# Patient Record
Sex: FEMALE | Race: WHITE | NOT HISPANIC OR LATINO | URBAN - METROPOLITAN AREA
[De-identification: names, ages, dates, MRNs, and addresses within clinical notes are randomized per-mention and may not be internally consistent; named-entity substitution may affect disease eponyms.]

---

## 2018-04-04 ENCOUNTER — OUTPATIENT (OUTPATIENT)
Dept: OUTPATIENT SERVICES | Facility: HOSPITAL | Age: 78
LOS: 1 days | End: 2018-04-04

## 2018-04-04 ENCOUNTER — INPATIENT (INPATIENT)
Facility: HOSPITAL | Age: 78
LOS: 3 days | Discharge: HOME | End: 2018-04-08
Payer: MEDICARE

## 2018-04-04 PROCEDURE — 99284 EMERGENCY DEPT VISIT MOD MDM: CPT

## 2018-04-04 PROCEDURE — 71045 X-RAY EXAM CHEST 1 VIEW: CPT | Mod: 26

## 2018-04-04 PROCEDURE — 71250 CT THORAX DX C-: CPT | Mod: 26

## 2018-04-05 ENCOUNTER — OUTPATIENT (OUTPATIENT)
Dept: OUTPATIENT SERVICES | Facility: HOSPITAL | Age: 78
LOS: 1 days | End: 2018-04-05

## 2018-04-06 ENCOUNTER — OUTPATIENT (OUTPATIENT)
Dept: OUTPATIENT SERVICES | Facility: HOSPITAL | Age: 78
LOS: 1 days | End: 2018-04-06

## 2018-04-06 PROCEDURE — 71046 X-RAY EXAM CHEST 2 VIEWS: CPT | Mod: 26

## 2018-04-07 ENCOUNTER — OUTPATIENT (OUTPATIENT)
Dept: OUTPATIENT SERVICES | Facility: HOSPITAL | Age: 78
LOS: 1 days | End: 2018-04-07

## 2018-04-08 ENCOUNTER — OUTPATIENT (OUTPATIENT)
Dept: OUTPATIENT SERVICES | Facility: HOSPITAL | Age: 78
LOS: 1 days | End: 2018-04-08

## 2019-01-28 ENCOUNTER — EMERGENCY (EMERGENCY)
Facility: HOSPITAL | Age: 79
LOS: 1 days | End: 2019-01-28
Payer: MEDICARE

## 2019-01-28 PROCEDURE — 71045 X-RAY EXAM CHEST 1 VIEW: CPT | Mod: 26

## 2019-01-28 PROCEDURE — 99285 EMERGENCY DEPT VISIT HI MDM: CPT

## 2019-11-21 ENCOUNTER — EMERGENCY (EMERGENCY)
Facility: HOSPITAL | Age: 79
LOS: 1 days | End: 2019-11-21
Admitting: EMERGENCY MEDICINE
Payer: MEDICARE

## 2019-11-21 PROCEDURE — 71045 X-RAY EXAM CHEST 1 VIEW: CPT | Mod: 26

## 2019-11-21 PROCEDURE — 99285 EMERGENCY DEPT VISIT HI MDM: CPT

## 2019-11-22 PROCEDURE — 93010 ELECTROCARDIOGRAM REPORT: CPT

## 2019-12-06 PROBLEM — Z00.00 ENCOUNTER FOR PREVENTIVE HEALTH EXAMINATION: Status: ACTIVE | Noted: 2019-12-06

## 2019-12-09 ENCOUNTER — NON-APPOINTMENT (OUTPATIENT)
Age: 79
End: 2019-12-09

## 2019-12-09 ENCOUNTER — APPOINTMENT (OUTPATIENT)
Dept: CARDIOLOGY | Facility: CLINIC | Age: 79
End: 2019-12-09
Payer: MEDICARE

## 2019-12-09 VITALS
HEIGHT: 61 IN | WEIGHT: 265 LBS | SYSTOLIC BLOOD PRESSURE: 178 MMHG | HEART RATE: 84 BPM | DIASTOLIC BLOOD PRESSURE: 90 MMHG | BODY MASS INDEX: 50.03 KG/M2 | OXYGEN SATURATION: 96 %

## 2019-12-09 DIAGNOSIS — Z86.79 PERSONAL HISTORY OF OTHER DISEASES OF THE CIRCULATORY SYSTEM: ICD-10-CM

## 2019-12-09 DIAGNOSIS — Z86.69 PERSONAL HISTORY OF OTHER DISEASES OF THE NERVOUS SYSTEM AND SENSE ORGANS: ICD-10-CM

## 2019-12-09 DIAGNOSIS — Z80.3 FAMILY HISTORY OF MALIGNANT NEOPLASM OF BREAST: ICD-10-CM

## 2019-12-09 DIAGNOSIS — Z82.49 FAMILY HISTORY OF ISCHEMIC HEART DISEASE AND OTHER DISEASES OF THE CIRCULATORY SYSTEM: ICD-10-CM

## 2019-12-09 DIAGNOSIS — Z80.42 FAMILY HISTORY OF MALIGNANT NEOPLASM OF PROSTATE: ICD-10-CM

## 2019-12-09 DIAGNOSIS — Z78.9 OTHER SPECIFIED HEALTH STATUS: ICD-10-CM

## 2019-12-09 PROCEDURE — 99214 OFFICE O/P EST MOD 30 MIN: CPT

## 2019-12-11 PROCEDURE — 93224 XTRNL ECG REC UP TO 48 HRS: CPT

## 2019-12-16 NOTE — PHYSICAL EXAM
[General Appearance - Well Developed] : well developed [Normal Appearance] : normal appearance [Well Groomed] : well groomed [General Appearance - Well Nourished] : well nourished [No Deformities] : no deformities [General Appearance - In No Acute Distress] : no acute distress [Normal Conjunctiva] : the conjunctiva exhibited no abnormalities [Eyelids - No Xanthelasma] : the eyelids demonstrated no xanthelasmas [Normal Oral Mucosa] : normal oral mucosa [No Oral Pallor] : no oral pallor [No Oral Cyanosis] : no oral cyanosis [Normal Jugular Venous A Waves Present] : normal jugular venous A waves present [Normal Jugular Venous V Waves Present] : normal jugular venous V waves present [No Jugular Venous Morse A Waves] : no jugular venous morse A waves [Heart Rate And Rhythm] : heart rate and rhythm were normal [Heart Sounds] : normal S1 and S2 [Murmurs] : no murmurs present [Respiration, Rhythm And Depth] : normal respiratory rhythm and effort [Exaggerated Use Of Accessory Muscles For Inspiration] : no accessory muscle use [Auscultation Breath Sounds / Voice Sounds] : lungs were clear to auscultation bilaterally [Abdomen Soft] : soft [Abdomen Tenderness] : non-tender [Nail Clubbing] : no clubbing of the fingernails [Cyanosis, Localized] : no localized cyanosis [Petechial Hemorrhages (___cm)] : no petechial hemorrhages [] : no ischemic changes [FreeTextEntry1] : Venous stasis b/l lower legs.

## 2019-12-16 NOTE — ASSESSMENT
[FreeTextEntry1] : To review, Natalya is a pleasant 79-year-old female seen in cardiac consultation with the following active issues.\par Palpitations: No dizziness, syncope or near syncope. Holter monitor for further evaluation. \par Edema: Echocardiogram to evaluate for any significant cardiomyopathy valvulopathy. Continue furosemide. She is scheduled for repeat lab work through her PMD.\par Obesity: Increase activity as tolerated. Decrease caloric intake. Recommend following Mediterranean diet.\par Abnormal EKG: No prior cardiac evaluation. No history of syncope. With her inability to move enough to elicit any exertional symptoms in conjunction with palpitations, recommend evaluation with pharmacologic nuclear stress test.\par Hypertension: Not well-controlled at today's visit. Home blood pressure monitor reveals normotension.  Patient has multiple blood pressure medication intolerances. Tolerating furosemide. Continue current medications for now. Further recommendations based on the above testing

## 2019-12-16 NOTE — REASON FOR VISIT
[Follow-Up - From Hospitalization] : follow-up of a recent hospitalization for [FreeTextEntry2] : Palpitations [FreeTextEntry1] : Natalya is a very pleasant 79-year-old female seen today with her daughter for cardiac consultation. Recently seen in [facility] ED for palpitations. She has a past medical history as noted below. Notable for hypertension with multiple medication intolerances. \par \par Palpitations have been ongoing for quite some time but has worsened since November.  She presented to the ED for further evaluation. There are no records to review at this time. She was given a single dose of diltiazem in the ED. Unsure as to why. She was discharged for outpatient followup.\par neli Presents today for consultation. States palpitations have continued. They occur on a daily basis. Worse in the morning or at night when she is laying still. She checks her pulse and said she skips beats. There is no associated chest pain, shortness of breath, dizziness, syncope or near syncope.\par \par History of hypertension, Multiple medicine intolerances to include valsartan, lisinopril, Norvasc, Bystolic, and atenolol.  Patient became edematous with Norvasc. Lost 6 pounds with discontinuation and addition of furosemide which is the only medication she is currently taking. Tolerating well. Scheduled for blood work later this week.\par \par BP on my exam was 156/74 mmHg.  Blood pressure readings from home are all in the 120s/60s.\par Patient is not active. Leads a very sedentary lifestyle.  There are stairs in her house but she is unable to utilize them.

## 2019-12-26 ENCOUNTER — APPOINTMENT (OUTPATIENT)
Dept: CARDIOLOGY | Facility: CLINIC | Age: 79
End: 2019-12-26
Payer: MEDICARE

## 2019-12-26 PROCEDURE — 93306 TTE W/DOPPLER COMPLETE: CPT

## 2019-12-30 ENCOUNTER — APPOINTMENT (OUTPATIENT)
Dept: CARDIOLOGY | Facility: CLINIC | Age: 79
End: 2019-12-30
Payer: MEDICARE

## 2019-12-30 VITALS
WEIGHT: 268 LBS | SYSTOLIC BLOOD PRESSURE: 170 MMHG | BODY MASS INDEX: 50.6 KG/M2 | DIASTOLIC BLOOD PRESSURE: 98 MMHG | HEIGHT: 61 IN | HEART RATE: 79 BPM | OXYGEN SATURATION: 98 %

## 2019-12-30 PROCEDURE — 99214 OFFICE O/P EST MOD 30 MIN: CPT

## 2019-12-30 NOTE — PHYSICAL EXAM
[General Appearance - Well Developed] : well developed [Normal Appearance] : normal appearance [Well Groomed] : well groomed [General Appearance - Well Nourished] : well nourished [No Deformities] : no deformities [General Appearance - In No Acute Distress] : no acute distress [Normal Conjunctiva] : the conjunctiva exhibited no abnormalities [Eyelids - No Xanthelasma] : the eyelids demonstrated no xanthelasmas [Normal Oral Mucosa] : normal oral mucosa [No Oral Pallor] : no oral pallor [No Oral Cyanosis] : no oral cyanosis [Normal Jugular Venous A Waves Present] : normal jugular venous A waves present [Normal Jugular Venous V Waves Present] : normal jugular venous V waves present [No Jugular Venous Morse A Waves] : no jugular venous morse A waves [Respiration, Rhythm And Depth] : normal respiratory rhythm and effort [Exaggerated Use Of Accessory Muscles For Inspiration] : no accessory muscle use [Heart Rate And Rhythm] : heart rate and rhythm were normal [Auscultation Breath Sounds / Voice Sounds] : lungs were clear to auscultation bilaterally [Heart Sounds] : normal S1 and S2 [Murmurs] : no murmurs present [Abdomen Soft] : soft [Abdomen Tenderness] : non-tender [Nail Clubbing] : no clubbing of the fingernails [Cyanosis, Localized] : no localized cyanosis [Petechial Hemorrhages (___cm)] : no petechial hemorrhages [] : no ischemic changes [FreeTextEntry1] : Venous stasis b/l lower legs.

## 2019-12-30 NOTE — HISTORY OF PRESENT ILLNESS
[FreeTextEntry1] : 12/9/19. Holter monitor. Sinus rhythm. Minimum HR: 65, maximum 8:120, average HR: 78 rate occasional PACs with risk or chills of 2-5 beats at a 162 beats per minute. Occasional to frequent unifocal PVCs. Symptomatic PVCs.\par 12/26/19. Technically difficult echocardiogram. EF: 55%. Mild MR, TR and AR.  Minimal PI. No pericardial effusion. Moderate PHTN.

## 2019-12-30 NOTE — REASON FOR VISIT
[Follow-Up - From Hospitalization] : follow-up of a recent hospitalization for [FreeTextEntry1] : Natalya is a pleasant 79-year-old female seen today with her daughter to follow up on CV testing. Was seen in [facility] ED for palpitations. She has a past medical history as noted below. Notable for hypertension with multiple medication intolerances. \par \par Palpitations have been ongoing for quite some time but has worsened since November.  She presented to the ED for further evaluation.  Was discharged for outpatient follow up.  No further hospitalizations. \par \par States palpitations have continued. They occur on a daily basis. Worse in the morning or at night when she is laying still. She checks her pulse and said she skips beats. There is no associated chest pain, shortness of breath, dizziness, syncope or near syncope.\par \par History of hypertension, Multiple medicine intolerances to include valsartan, lisinopril, Norvasc, Bystolic, and atenolol.  Patient became edematous with Norvasc. Lost 6 pounds with discontinuation and addition of furosemide which is the only medication she is currently taking. Tolerating well. \par \par BP on my exam was 160/78 mmHg.  Pt insists that blood pressure readings from home are all in the 120s/60s.\par Patient is not active. Leads a very sedentary lifestyle.  There are stairs in her house but she is unable to utilize them. [FreeTextEntry2] : Palpitations

## 2019-12-30 NOTE — ASSESSMENT
[FreeTextEntry1] : To review, Natalya is a pleasant 79-year-old female seen in cardiac consultation with the following active issues.\par Palpitations: No dizziness, syncope or near syncope. No significant arrhythmia noted on Holter monitor. Frequent PVCs some of which were symptomatic. Patient has multiple medication intolerances to include Bystolic and atenolol.  Normal EF. Patient refused nuclear stress test. Recommend low dose metoprolol. 12.5 mg q.d. with plan to up titrate to 25 mg if patient tolerates.\par \par Edema: Improved with furosemide.  Continue.  Low Na+ diet.  Leg elevation. \par \par Obesity: Increase activity as tolerated. Decrease caloric intake. Recommend following Mediterranean diet.\par \par Abnormal EKG: No prior cardiac evaluation. No history of syncope. With her inability to move enough to elicit any exertional symptoms in conjunction with palpitations, recommend evaluation with pharmacologic nuclear stress test.  Pt refused.  Red flag symptoms that would warrant emergent evaluation discussed.  Pt verbalizes understanding. \par \par Hypertension: ? white coat syndrome.  Not well-controlled at today's visit. Home blood pressure monitor reveals normotension.  Patient has multiple blood pressure medication intolerances. Tolerating furosemide. Add metoprolol as noted above. \par \par Close clinical follow up.  She is seeing endocrine next week and will be following up with PMD.  \par 3 month OV in our office.

## 2020-01-08 RX ORDER — METOPROLOL SUCCINATE 25 MG/1
25 TABLET, EXTENDED RELEASE ORAL
Qty: 90 | Refills: 2 | Status: DISCONTINUED | COMMUNITY
Start: 2019-12-30 | End: 2020-01-08

## 2020-02-20 ENCOUNTER — OUTPATIENT (OUTPATIENT)
Dept: OUTPATIENT SERVICES | Facility: HOSPITAL | Age: 80
LOS: 1 days | End: 2020-02-20

## 2020-04-06 ENCOUNTER — APPOINTMENT (OUTPATIENT)
Dept: CARDIOLOGY | Facility: CLINIC | Age: 80
End: 2020-04-06

## 2020-06-22 ENCOUNTER — OUTPATIENT (OUTPATIENT)
Dept: OUTPATIENT SERVICES | Facility: HOSPITAL | Age: 80
LOS: 1 days | End: 2020-06-22

## 2020-07-03 ENCOUNTER — OUTPATIENT (OUTPATIENT)
Dept: OUTPATIENT SERVICES | Facility: HOSPITAL | Age: 80
LOS: 1 days | End: 2020-07-03

## 2021-03-16 ENCOUNTER — OUTPATIENT (OUTPATIENT)
Dept: OUTPATIENT SERVICES | Facility: HOSPITAL | Age: 81
LOS: 1 days | End: 2021-03-16

## 2021-08-02 ENCOUNTER — OUTPATIENT (OUTPATIENT)
Dept: OUTPATIENT SERVICES | Facility: HOSPITAL | Age: 81
LOS: 1 days | End: 2021-08-02

## 2021-10-16 ENCOUNTER — EMERGENCY (EMERGENCY)
Facility: HOSPITAL | Age: 81
LOS: 1 days | End: 2021-10-16
Admitting: EMERGENCY MEDICINE
Payer: MEDICARE

## 2021-10-16 PROCEDURE — 99285 EMERGENCY DEPT VISIT HI MDM: CPT

## 2021-10-17 PROCEDURE — 71045 X-RAY EXAM CHEST 1 VIEW: CPT | Mod: 26

## 2021-10-17 PROCEDURE — 93010 ELECTROCARDIOGRAM REPORT: CPT

## 2022-01-08 ENCOUNTER — EMERGENCY (EMERGENCY)
Facility: HOSPITAL | Age: 82
LOS: 1 days | End: 2022-01-08
Payer: MEDICARE

## 2022-01-08 ENCOUNTER — OUTPATIENT (OUTPATIENT)
Dept: OUTPATIENT SERVICES | Facility: HOSPITAL | Age: 82
LOS: 1 days | End: 2022-01-08

## 2022-01-08 DIAGNOSIS — U07.1 COVID-19: ICD-10-CM

## 2022-01-08 DIAGNOSIS — Z88.2 ALLERGY STATUS TO SULFONAMIDES: ICD-10-CM

## 2022-01-08 DIAGNOSIS — R53.1 WEAKNESS: ICD-10-CM

## 2022-01-08 DIAGNOSIS — I45.81 LONG QT SYNDROME: ICD-10-CM

## 2022-01-08 DIAGNOSIS — I49.8 OTHER SPECIFIED CARDIAC ARRHYTHMIAS: ICD-10-CM

## 2022-01-08 DIAGNOSIS — E04.1 NONTOXIC SINGLE THYROID NODULE: ICD-10-CM

## 2022-01-08 DIAGNOSIS — I10 ESSENTIAL (PRIMARY) HYPERTENSION: ICD-10-CM

## 2022-01-08 PROCEDURE — 71275 CT ANGIOGRAPHY CHEST: CPT | Mod: 26

## 2022-01-08 PROCEDURE — 99285 EMERGENCY DEPT VISIT HI MDM: CPT | Mod: CS

## 2022-01-08 PROCEDURE — 93010 ELECTROCARDIOGRAM REPORT: CPT

## 2022-01-08 PROCEDURE — 71045 X-RAY EXAM CHEST 1 VIEW: CPT | Mod: 26

## 2022-01-09 ENCOUNTER — OUTPATIENT (OUTPATIENT)
Dept: OUTPATIENT SERVICES | Facility: HOSPITAL | Age: 82
LOS: 1 days | End: 2022-01-09

## 2022-01-10 ENCOUNTER — OUTPATIENT (OUTPATIENT)
Dept: OUTPATIENT SERVICES | Facility: HOSPITAL | Age: 82
LOS: 1 days | End: 2022-01-10

## 2022-01-10 PROCEDURE — 93010 ELECTROCARDIOGRAM REPORT: CPT

## 2022-01-20 ENCOUNTER — OUTPATIENT (OUTPATIENT)
Dept: OUTPATIENT SERVICES | Facility: HOSPITAL | Age: 82
LOS: 1 days | End: 2022-01-20

## 2022-01-24 ENCOUNTER — NON-APPOINTMENT (OUTPATIENT)
Age: 82
End: 2022-01-24

## 2022-01-24 ENCOUNTER — APPOINTMENT (OUTPATIENT)
Dept: CARDIOLOGY | Facility: CLINIC | Age: 82
End: 2022-01-24
Payer: MEDICARE

## 2022-01-24 VITALS
TEMPERATURE: 97.5 F | HEIGHT: 61 IN | BODY MASS INDEX: 50.98 KG/M2 | WEIGHT: 270 LBS | HEART RATE: 83 BPM | OXYGEN SATURATION: 97 % | SYSTOLIC BLOOD PRESSURE: 180 MMHG | DIASTOLIC BLOOD PRESSURE: 88 MMHG

## 2022-01-24 PROCEDURE — 99214 OFFICE O/P EST MOD 30 MIN: CPT

## 2022-01-24 NOTE — ASSESSMENT
[FreeTextEntry1] : To review, Natalya is a pleasant 81-year-old female seen in cardiac consultation with the following active issues.\par Palpitations: No dizziness, syncope or near syncope. No significant arrhythmia noted on Holter monitor. Frequent PVCs some of which were symptomatic. Patient has multiple medication intolerances to include Bystolic and atenolol.  Normal EF. Patient refused nuclear stress test. Recommend low dose metoprolol. 12.5 mg q.d. with plan to up titrate to 25 mg if patient tolerates.\par \par Edema: Improved with furosemide.  Continue.  Low Na+ diet.  Leg elevation. \par \par Obesity: Increase activity as tolerated. Decrease caloric intake. Recommend following Mediterranean diet.\par \par Abnormal EKG: No prior cardiac evaluation. No history of syncope. With her inability to move enough to elicit any exertional symptoms in conjunction with palpitations, recommend evaluation with pharmacologic nuclear stress test.  Pt refused.  Red flag symptoms that would warrant emergent evaluation discussed.  Pt verbalizes understanding. \par \par Hypertension: ? white coat syndrome.  Not well-controlled at today's visit. Home blood pressure monitor reveals normotension.  Patient has multiple blood pressure medication intolerances. Tolerating furosemide. Add metoprolol as noted above. \par \par Close clinical follow up.  She is seeing endocrine next week and will be following up with PMD.  \par 3 month OV in our office.

## 2022-01-24 NOTE — PHYSICAL EXAM
[Normal Jugular Venous A Waves Present] : normal jugular venous A waves present [Normal Jugular Venous V Waves Present] : normal jugular venous V waves present [No Jugular Venous Morse A Waves] : no jugular venous morse A waves [General Appearance - Well Developed] : well developed [Normal Appearance] : normal appearance [Well Groomed] : well groomed [General Appearance - Well Nourished] : well nourished [No Deformities] : no deformities [General Appearance - In No Acute Distress] : no acute distress [Normal Conjunctiva] : the conjunctiva exhibited no abnormalities [Eyelids - No Xanthelasma] : the eyelids demonstrated no xanthelasmas [Normal Oral Mucosa] : normal oral mucosa [No Oral Pallor] : no oral pallor [No Oral Cyanosis] : no oral cyanosis [Respiration, Rhythm And Depth] : normal respiratory rhythm and effort [Exaggerated Use Of Accessory Muscles For Inspiration] : no accessory muscle use [Auscultation Breath Sounds / Voice Sounds] : lungs were clear to auscultation bilaterally [Heart Rate And Rhythm] : heart rate and rhythm were normal [Heart Sounds] : normal S1 and S2 [Murmurs] : no murmurs present [Abdomen Soft] : soft [Abdomen Tenderness] : non-tender [Nail Clubbing] : no clubbing of the fingernails [Cyanosis, Localized] : no localized cyanosis [Petechial Hemorrhages (___cm)] : no petechial hemorrhages [] : no ischemic changes [FreeTextEntry1] : Venous stasis b/l lower legs.

## 2022-01-24 NOTE — REASON FOR VISIT
[Follow-Up - From Hospitalization] : follow-up of a recent hospitalization for [FreeTextEntry1] : Natalya is a pleasant 81-year-old female who presents today for cardiac follow up. She has a past medical history as noted below. Notable for hypertension with multiple medication intolerances. \par \par Palpitations have been ongoing for quite some time but has worsened since November.  She presented to the ED for further evaluation.  Was discharged for outpatient follow up.  No further hospitalizations. \par \par States palpitations have continued. They occur on a daily basis. Worse in the morning or at night when she is laying still. She checks her pulse and said she skips beats. There is no associated chest pain, shortness of breath, dizziness, syncope or near syncope.\par \par History of hypertension, Multiple medicine intolerances to include valsartan, lisinopril, Norvasc, Bystolic, and atenolol.  Patient became edematous with Norvasc. Lost 6 pounds with discontinuation and addition of furosemide which is the only medication she is currently taking. Tolerating well. \par \par BP on my exam was 160/78 mmHg.  Pt insists that blood pressure readings from home are all in the 120s/60s.\par Patient is not active. Leads a very sedentary lifestyle.  There are stairs in her house but she is unable to utilize them. [FreeTextEntry2] : Palpitations

## 2022-01-27 DIAGNOSIS — D69.6 THROMBOCYTOPENIA, UNSPECIFIED: ICD-10-CM

## 2022-02-07 DIAGNOSIS — R94.31 ABNORMAL ELECTROCARDIOGRAM [ECG] [EKG]: ICD-10-CM

## 2022-02-07 DIAGNOSIS — U07.1 COVID-19: ICD-10-CM

## 2022-02-07 DIAGNOSIS — I10 ESSENTIAL (PRIMARY) HYPERTENSION: ICD-10-CM

## 2022-02-21 ENCOUNTER — APPOINTMENT (OUTPATIENT)
Dept: CARDIOLOGY | Facility: CLINIC | Age: 82
End: 2022-02-21
Payer: MEDICARE

## 2022-02-21 VITALS
DIASTOLIC BLOOD PRESSURE: 84 MMHG | HEART RATE: 77 BPM | HEIGHT: 61 IN | TEMPERATURE: 96.9 F | BODY MASS INDEX: 49.28 KG/M2 | WEIGHT: 261 LBS | SYSTOLIC BLOOD PRESSURE: 168 MMHG | OXYGEN SATURATION: 100 %

## 2022-02-21 PROCEDURE — 93306 TTE W/DOPPLER COMPLETE: CPT

## 2022-02-21 PROCEDURE — 99214 OFFICE O/P EST MOD 30 MIN: CPT

## 2022-02-21 RX ORDER — RIVAROXABAN 10 MG/1
10 TABLET, FILM COATED ORAL
Refills: 0 | Status: DISCONTINUED | COMMUNITY
End: 2022-02-21

## 2022-02-21 RX ORDER — FUROSEMIDE 20 MG/1
20 TABLET ORAL
Qty: 90 | Refills: 1 | Status: DISCONTINUED | COMMUNITY
Start: 1900-01-01 | End: 2022-02-21

## 2022-02-21 NOTE — ASSESSMENT
[FreeTextEntry1] : History of palpitations.  Frequent PVCs some of which were symptomatic; she says that her palpitations have decreased lately. Patient has multiple medication intolerances to include Bystolic and atenolol.  Normal EF. Patient refused nuclear stress test. Recommended low dose metoprolol. 12.5 mg q.d. which she stopped.  We can try bisoprolol but she declined.\par \par Long QTC on EKG at the [facility] when the patient was admitted with Covid.  EKG today: QTC is normal.  EKG is unchanged.\par \par Edema: Improved with furosemide. Low Na+ diet.  Leg elevation and compression stockings.  She has discontinued Lasix.  She feels the edema is okay.\par \par Obesity: Increase activity as tolerated. Decrease caloric intake. \par \par Patient with coronary risk factors.  Declined Lexiscan stress test in the past.  Echocardiogram in December 2019 showed normal EF and pulmonary hypertension PASP 52.  Echocardiogram in February 2022 showed normal EF, indeterminate diastolic function, septal thickness of 1.3 cm and PASP 52\par \par white coat syndrome and anxiety.  Blood pressure not well-controlled at today's visit. Home blood pressure monitor reveals normotension.  Patient has multiple blood pressure medication intolerances. Recommended low-dose bisoprolol which she declined\par \par Patient and daughter understand the risk of uncontrolled hypertension.\par \par Follow-up in 6 months\par \par Thank you for this referral and allowing me to participate in the care of this patient.  If I can be of any further help or  if you have any questions, please do not hesitate to contact me\par \par \par Sincerely,\par \par Rome Edwards MD, FAC, JOCY

## 2022-02-21 NOTE — PHYSICAL EXAM
[General Appearance - Well Developed] : well developed [Normal Appearance] : normal appearance [Well Groomed] : well groomed [No Deformities] : no deformities [General Appearance - Well Nourished] : well nourished [General Appearance - In No Acute Distress] : no acute distress [Normal Conjunctiva] : the conjunctiva exhibited no abnormalities [Eyelids - No Xanthelasma] : the eyelids demonstrated no xanthelasmas [Normal Oral Mucosa] : normal oral mucosa [No Oral Pallor] : no oral pallor [No Oral Cyanosis] : no oral cyanosis [Respiration, Rhythm And Depth] : normal respiratory rhythm and effort [Exaggerated Use Of Accessory Muscles For Inspiration] : no accessory muscle use [Auscultation Breath Sounds / Voice Sounds] : lungs were clear to auscultation bilaterally [Heart Rate And Rhythm] : heart rate and rhythm were normal [Heart Sounds] : normal S1 and S2 [Murmurs] : no murmurs present [Abdomen Soft] : soft [Abdomen Tenderness] : non-tender [Nail Clubbing] : no clubbing of the fingernails [Cyanosis, Localized] : no localized cyanosis [Petechial Hemorrhages (___cm)] : no petechial hemorrhages [] : no ischemic changes [FreeTextEntry1] : Venous stasis b/l lower legs.

## 2022-02-21 NOTE — REASON FOR VISIT
[FreeTextEntry1] : Natalya is a pleasant 81-year-old female-  was [place] [facility] [place] with Covid, dyspnea, fatigue and probably dehydration.  CT chest was negative for PE.  She was discharged with Xarelto 10 mg daily for 30 days for DVT prophylaxis.\par \par During hospitalization, EKG showed long QTC.  There was no polymorphic VT or torsade on telemetry.\par \par She has a past medical history-  Notable for hypertension with multiple medication intolerances.  Patient is known to have anxiety episodes and labile hypertension.  Pressure today is quite high however home blood pressure log shows normal readings.  She is asymptomatic for hypertension.  She takes lisinopril.  Lasix was stopped.\par \par Palpitations have been ongoing for quite some time  \par \par States palpitations have continued. There is no associated chest pain, shortness of breath, dizziness, syncope or near syncope.\par \par History of hypertension, Multiple medicine intolerances to include valsartan, lisinopril, Norvasc, Bystolic, and atenolol.  Patient became edematous with Norvasc. currently takes only lisinopril\par \par Patient is not active. Leads a very sedentary lifestyle.  There are stairs in her house but she is unable to utilize them.  Currently in wheelchair [Follow-Up - From Hospitalization] : follow-up of a recent hospitalization for [FreeTextEntry2] : Palpitations

## 2022-02-21 NOTE — REVIEW OF SYSTEMS
[Dyspnea on exertion] : dyspnea during exertion [Negative] : Respiratory [FreeTextEntry2] : With a cane

## 2022-02-22 ENCOUNTER — APPOINTMENT (OUTPATIENT)
Dept: ULTRASOUND IMAGING | Facility: CLINIC | Age: 82
End: 2022-02-22

## 2022-02-22 ENCOUNTER — APPOINTMENT (OUTPATIENT)
Dept: RADIOLOGY | Facility: CLINIC | Age: 82
End: 2022-02-22
Payer: MEDICARE

## 2022-02-22 PROCEDURE — 76536 US EXAM OF HEAD AND NECK: CPT

## 2022-02-22 PROCEDURE — 71046 X-RAY EXAM CHEST 2 VIEWS: CPT

## 2022-03-08 DIAGNOSIS — U07.1 COVID-19: ICD-10-CM

## 2022-03-08 DIAGNOSIS — R94.31 ABNORMAL ELECTROCARDIOGRAM [ECG] [EKG]: ICD-10-CM

## 2022-03-08 DIAGNOSIS — I10 ESSENTIAL (PRIMARY) HYPERTENSION: ICD-10-CM

## 2022-03-10 ENCOUNTER — EMERGENCY (EMERGENCY)
Facility: HOSPITAL | Age: 82
LOS: 1 days | Discharge: HOME | End: 2022-03-10
Admitting: STUDENT IN AN ORGANIZED HEALTH CARE EDUCATION/TRAINING PROGRAM
Payer: MEDICARE

## 2022-03-10 DIAGNOSIS — E66.9 OBESITY, UNSPECIFIED: ICD-10-CM

## 2022-03-10 DIAGNOSIS — I47.1 SUPRAVENTRICULAR TACHYCARDIA: ICD-10-CM

## 2022-03-10 DIAGNOSIS — I10 ESSENTIAL (PRIMARY) HYPERTENSION: ICD-10-CM

## 2022-03-10 DIAGNOSIS — Z87.891 PERSONAL HISTORY OF NICOTINE DEPENDENCE: ICD-10-CM

## 2022-03-10 DIAGNOSIS — R00.2 PALPITATIONS: ICD-10-CM

## 2022-03-10 PROCEDURE — 93010 ELECTROCARDIOGRAM REPORT: CPT

## 2022-03-10 PROCEDURE — 71045 X-RAY EXAM CHEST 1 VIEW: CPT | Mod: 26

## 2022-03-10 PROCEDURE — 99285 EMERGENCY DEPT VISIT HI MDM: CPT

## 2022-03-11 ENCOUNTER — OUTPATIENT (OUTPATIENT)
Dept: OUTPATIENT SERVICES | Facility: HOSPITAL | Age: 82
LOS: 1 days | End: 2022-03-11

## 2022-03-11 PROCEDURE — 99214 OFFICE O/P EST MOD 30 MIN: CPT

## 2022-03-11 PROCEDURE — 93010 ELECTROCARDIOGRAM REPORT: CPT

## 2022-03-31 DIAGNOSIS — R94.31 ABNORMAL ELECTROCARDIOGRAM [ECG] [EKG]: ICD-10-CM

## 2022-03-31 DIAGNOSIS — I10 ESSENTIAL (PRIMARY) HYPERTENSION: ICD-10-CM

## 2022-03-31 DIAGNOSIS — U07.1 COVID-19: ICD-10-CM

## 2022-04-02 DIAGNOSIS — I10 ESSENTIAL (PRIMARY) HYPERTENSION: ICD-10-CM

## 2022-04-02 DIAGNOSIS — R94.31 ABNORMAL ELECTROCARDIOGRAM [ECG] [EKG]: ICD-10-CM

## 2022-04-02 DIAGNOSIS — U07.1 COVID-19: ICD-10-CM

## 2022-08-19 ENCOUNTER — APPOINTMENT (OUTPATIENT)
Dept: CARDIOLOGY | Facility: CLINIC | Age: 82
End: 2022-08-19

## 2022-08-19 VITALS
HEART RATE: 84 BPM | RESPIRATION RATE: 14 BRPM | WEIGHT: 266 LBS | SYSTOLIC BLOOD PRESSURE: 186 MMHG | OXYGEN SATURATION: 97 % | BODY MASS INDEX: 50.22 KG/M2 | DIASTOLIC BLOOD PRESSURE: 94 MMHG | TEMPERATURE: 97.1 F | HEIGHT: 61 IN

## 2022-08-19 PROCEDURE — 99214 OFFICE O/P EST MOD 30 MIN: CPT

## 2022-08-19 NOTE — PHYSICAL EXAM
[General Appearance - Well Developed] : well developed [Normal Appearance] : normal appearance [Well Groomed] : well groomed [General Appearance - Well Nourished] : well nourished [No Deformities] : no deformities [General Appearance - In No Acute Distress] : no acute distress [Normal Conjunctiva] : the conjunctiva exhibited no abnormalities [Eyelids - No Xanthelasma] : the eyelids demonstrated no xanthelasmas [Normal Oral Mucosa] : normal oral mucosa [No Oral Pallor] : no oral pallor [No Oral Cyanosis] : no oral cyanosis [Respiration, Rhythm And Depth] : normal respiratory rhythm and effort [Exaggerated Use Of Accessory Muscles For Inspiration] : no accessory muscle use [Auscultation Breath Sounds / Voice Sounds] : lungs were clear to auscultation bilaterally [Heart Rate And Rhythm] : heart rate and rhythm were normal [Heart Sounds] : normal S1 and S2 [Murmurs] : no murmurs present [Abdomen Soft] : soft [Abdomen Tenderness] : non-tender [Nail Clubbing] : no clubbing of the fingernails [Cyanosis, Localized] : no localized cyanosis [Petechial Hemorrhages (___cm)] : no petechial hemorrhages [] : no ischemic changes [FreeTextEntry1] : Venous stasis b/l lower legs.

## 2022-08-19 NOTE — ASSESSMENT
[FreeTextEntry1] : History of palpitations.  Frequent PVCs some of which were symptomatic; she says that her palpitations have decreased lately. Patient has multiple medication intolerances to include Bystolic and atenolol.  Normal EF. Patient refused nuclear stress test. Recommended low dose metoprolol. 12.5 mg q.d. which she stopped.  We can try bisoprolol but she declined.\par \par Long QTC on EKG at the [facility] when the patient was admitted with Covid.  EKG today: QTC is normal.  EKG is unchanged.\par \par Edema: Improved with furosemide. Low Na+ diet.  Leg elevation and compression stockings.  She has discontinued Lasix.  She feels the edema is okay.\par \par Obesity: Increase activity as tolerated. Decrease caloric intake.  There has been no significant change in weight over the past few years\par \par Patient with coronary risk factors.  Declined Lexiscan stress test in the past.  Echocardiogram in December 2019 showed normal EF and pulmonary hypertension PASP 52.  Echocardiogram in February 2022 showed normal EF, indeterminate diastolic function, septal thickness of 1.3 cm and PASP 52\par \par white coat syndrome and anxiety.  Blood pressure not well-controlled at today's visit. Home blood pressure monitor reveals normotension.  Patient has multiple blood pressure medication intolerances. Recommended low-dose bisoprolol or verapamil which she declined\par \par She also does not want any treatment for her palpitations.  She is concerned for side effects.\par \par Patient and daughter understand the risk of uncontrolled hypertension.\par \par Follow-up in 6 months\par \par Thank you for this referral and allowing me to participate in the care of this patient.  If I can be of any further help or  if you have any questions, please do not hesitate to contact me\par \par \par Sincerely,\par \par Rome Edwards MD, [facility], JOC[facility]

## 2022-08-19 NOTE — REASON FOR VISIT
[Follow-Up - From Hospitalization] : follow-up of a recent hospitalization for [FreeTextEntry1] : Natalya is a pleasant 82-year-old female-  was admitted to [facility] in January 2022 with Covid, dyspnea, fatigue and probably dehydration.  CT chest was negative for PE.  \par \par During every 2022 hospitalization, EKG showed long QTC transiently .  There was no polymorphic VT or torsade on telemetry.\par \par She was at [facility] again on 3/10/2022 with rapid palpitations however telemetry was unremarkable and EKG did not show tachycardia.  Her blood pressure at the hospital was 131/70 1 mmHg.\par \par Today, she has uncontrolled high blood pressures and she is asymptomatic.  Her home blood pressure log is normal.\par \par Past medical history-  Notable for hypertension with multiple medication intolerances.  Patient is known to have anxiety episodes and labile hypertension. She takes lisinopril.  Lasix was stopped.\par \par History of intermittent palpitations for quite some time / decades;  There is no associated chest pain, shortness of breath, dizziness, syncope or near syncope.\par \par Multiple medicine intolerances to include valsartan, lisinopril, Norvasc, Bystolic, and atenolol.  Patient became edematous with Norvasc. currently takes only lisinopril\par \par Patient is not active. Leads a very sedentary lifestyle.  There are stairs in her house but she is unable to utilize them.  Currently in wheelchair [FreeTextEntry2] : Palpitations

## 2022-08-19 NOTE — HISTORY OF PRESENT ILLNESS
[FreeTextEntry1] : Reviewed labs from June 2022: Normal A1c.  Normal creatinine and LFT.  LDL 95.\par EKG from 3/11/2022: Sinus rhythm.  PAC.  I RBBB with LAFB.

## 2022-08-26 ENCOUNTER — APPOINTMENT (OUTPATIENT)
Dept: ULTRASOUND IMAGING | Facility: CLINIC | Age: 82
End: 2022-08-26

## 2022-08-26 PROCEDURE — 76536 US EXAM OF HEAD AND NECK: CPT

## 2022-12-09 ENCOUNTER — APPOINTMENT (OUTPATIENT)
Dept: CARDIOLOGY | Facility: CLINIC | Age: 82
End: 2022-12-09

## 2022-12-09 ENCOUNTER — NON-APPOINTMENT (OUTPATIENT)
Age: 82
End: 2022-12-09

## 2022-12-09 VITALS
DIASTOLIC BLOOD PRESSURE: 98 MMHG | BODY MASS INDEX: 50.98 KG/M2 | RESPIRATION RATE: 18 BRPM | TEMPERATURE: 98 F | HEART RATE: 95 BPM | SYSTOLIC BLOOD PRESSURE: 196 MMHG | OXYGEN SATURATION: 97 % | WEIGHT: 270 LBS | HEIGHT: 61 IN

## 2022-12-09 LAB
INR PPP: 1.2 RATIO
QUALITY CONTROL: YES

## 2022-12-09 PROCEDURE — 93000 ELECTROCARDIOGRAM COMPLETE: CPT

## 2022-12-09 PROCEDURE — 99215 OFFICE O/P EST HI 40 MIN: CPT

## 2022-12-09 RX ORDER — APIXABAN 5 MG/1
5 TABLET, FILM COATED ORAL
Qty: 30 | Refills: 0 | Status: DISCONTINUED | COMMUNITY
End: 2022-12-09

## 2022-12-09 RX ORDER — WARFARIN 5 MG/1
5 TABLET ORAL
Qty: 60 | Refills: 1 | Status: ACTIVE | COMMUNITY
Start: 2022-12-09 | End: 1900-01-01

## 2022-12-09 NOTE — REASON FOR VISIT
[FreeTextEntry1] : Natalya is a pleasant 82-year-old female-  \par \par She was noted to have repeat A. fib with ventricular rate of 145 in Dr. Cohen's office on 12/6/2022 and went to the ER with shortness of breath.  She was given 2 L of IV fluid.  She was also given metoprolol.  She had reaction to metoprolol with GI upset and diarrhea.  She converted to sinus rhythm.  Eliquis was not started by the patient due to costs.  CT scan showed cardiomegaly, pleural effusion bilateral and pericardial effusion. \par \par Today, she still has shortness of breath with minimal activity.  O2 sat 97% on room air. she is in sinus rhythm.  Blood pressure is elevated.  She claims that she has whitecoat syndrome.\par \par She was also at [facility] in January 2022 with Covid, dyspnea, fatigue and probably dehydration.  CT chest was negative for PE.  \par \par During early 2022 hospitalization, EKG showed long QTC transiently .  There was no polymorphic VT or torsade on telemetry.\par \par Past medical history-  Notable for hypertension with multiple medication intolerances.  Patient is known to have anxiety episodes and labile hypertension. She takes lisinopril.  Lasix was stopped in the past.\par \par History of intermittent palpitations for quite some time / decades;  There is no associated chest pain,  dizziness, syncope or near syncope.\par \par Multiple medicine intolerances to include valsartan, lisinopril, Norvasc, Bystolic, and atenolol.  Patient became edematous with Norvasc. currently takes only lisinopril\par \par Patient is not active. Leads a very sedentary lifestyle.  There are stairs in her house but she is unable to utilize them.  Walks with a cane [Follow-Up - From Hospitalization] : follow-up of a recent hospitalization for [FreeTextEntry2] : Palpitations

## 2022-12-09 NOTE — ASSESSMENT
[FreeTextEntry1] : History of palpitations.  Frequent PVCs.  Now with we have documented evidence of PAF with high ventricular rate als.\par \par  patient has multiple medication intolerances to include Bystolic and atenolol.  Normal EF on echo in February 2022. Patient refused nuclear stress test.  \par \par Today she has sinus rhythm with a resting rate of 95.  Patient does have anxiety.  Uncontrolled hypertension.  She is now agreeable to bisoprolol.\par \par PAF: She would need anticoagulation.  Xarelto and Eliquis are unaffordable.  Coumadin will be started.  [place] labs will check INR as needed.\par \par Long QTC [place] [facility] when the [place] was admitted with Covid.  EKG today: QTC is normal.  She also has IRBBB plus AFB\par \par Dyspnea on exertion.  Recent CT scan showed pleural effusion bilateral as well as pericardial effusion.  Start Lasix.  Check echocardiogram.  Check BMP after 1 week.  Also check ESR and Lyme's.  The patient had normal TSH and CPK and creatinine on labs at the hospital\par \par Obesity: Increase activity as tolerated. Decrease caloric intake.  There has been no significant change in weight over the past few years\par \par Patient with coronary risk factors.  Declined Lexiscan stress test in the past.  Echocardiogram in December 2019 showed normal EF and pulmonary hypertension PASP 52.  Echocardiogram in February 2022 showed normal EF, indeterminate diastolic function, septal thickness of 1.3 cm and PASP 52\par \par white coat syndrome and anxiety.  Blood pressure not well-controlled at today's visit. Home blood pressure monitor reveals normotension.  Patient has multiple blood pressure medication intolerances. Recommended low-dose bisoprolol or verapamil which she declined\par \par Patient and daughter understand the risk of uncontrolled hypertension.  And limitations due to several intolerances.\par \par Follow-up after echo\par \par Thank you for this referral and allowing me to participate in the care of this patient.  If I can be of any further help or  if you have any questions, please do not hesitate to contact me\par \par \par Sincerely,\par \par Rome Edwards MD, [facility], [place]

## 2022-12-10 ENCOUNTER — NON-APPOINTMENT (OUTPATIENT)
Age: 82
End: 2022-12-10

## 2022-12-15 LAB
INR PPP: 2.1 RATIO
QUALITY CONTROL: YES

## 2022-12-16 ENCOUNTER — NON-APPOINTMENT (OUTPATIENT)
Age: 82
End: 2022-12-16

## 2022-12-21 ENCOUNTER — NON-APPOINTMENT (OUTPATIENT)
Age: 82
End: 2022-12-21

## 2022-12-21 ENCOUNTER — APPOINTMENT (OUTPATIENT)
Dept: CARDIOLOGY | Facility: CLINIC | Age: 82
End: 2022-12-21

## 2022-12-21 VITALS
DIASTOLIC BLOOD PRESSURE: 80 MMHG | HEART RATE: 98 BPM | WEIGHT: 257 LBS | BODY MASS INDEX: 48.52 KG/M2 | HEIGHT: 61 IN | SYSTOLIC BLOOD PRESSURE: 124 MMHG | OXYGEN SATURATION: 95 % | TEMPERATURE: 96.9 F

## 2022-12-21 PROCEDURE — 99213 OFFICE O/P EST LOW 20 MIN: CPT

## 2022-12-21 RX ORDER — BISOPROLOL FUMARATE 5 MG/1
5 TABLET, FILM COATED ORAL DAILY
Qty: 90 | Refills: 0 | Status: DISCONTINUED | COMMUNITY
Start: 2022-12-09 | End: 2022-12-21

## 2022-12-21 RX ORDER — FUROSEMIDE 20 MG/1
20 TABLET ORAL
Qty: 90 | Refills: 0 | Status: DISCONTINUED | COMMUNITY
Start: 2022-12-09 | End: 2022-12-21

## 2022-12-21 NOTE — HISTORY OF PRESENT ILLNESS
[FreeTextEntry1] : Atrial fibrillation: The patient went home from the hospital yesterday.  She was discharged on a pharmacologic regimen for atrial fibrillation including warfarin.  The patient used a pulse ox last night and her heart rate was found to be elevated.  Today she is in normal sinus rhythm.  Overall I have recommended continuation of current pharmacologic therapy.   The patient wishes to take Lasix alone without her other pharmacologic therapy.\par \par Palpitations: The patient was asymptomatic during her tachycardia as determined by pulse oximetry.

## 2022-12-23 ENCOUNTER — TRANSCRIPTION ENCOUNTER (OUTPATIENT)
Age: 82
End: 2022-12-23

## 2022-12-27 ENCOUNTER — APPOINTMENT (OUTPATIENT)
Dept: CARDIOLOGY | Facility: CLINIC | Age: 82
End: 2022-12-27

## 2022-12-27 LAB — INR PPP: 2.2

## 2022-12-27 PROCEDURE — 93793 ANTICOAG MGMT PT WARFARIN: CPT

## 2022-12-27 PROCEDURE — 85610 PROTHROMBIN TIME: CPT | Mod: QW

## 2022-12-28 LAB — INR PPP: 3.85

## 2023-01-03 LAB — INR PPP: 1.71

## 2023-01-05 ENCOUNTER — APPOINTMENT (OUTPATIENT)
Dept: CARDIOLOGY | Facility: CLINIC | Age: 83
End: 2023-01-05
Payer: MEDICARE

## 2023-01-05 VITALS
DIASTOLIC BLOOD PRESSURE: 80 MMHG | HEIGHT: 61 IN | HEART RATE: 112 BPM | BODY MASS INDEX: 48.71 KG/M2 | WEIGHT: 258 LBS | OXYGEN SATURATION: 98 % | TEMPERATURE: 97.8 F | SYSTOLIC BLOOD PRESSURE: 142 MMHG

## 2023-01-05 PROCEDURE — 99215 OFFICE O/P EST HI 40 MIN: CPT

## 2023-01-05 RX ORDER — FUROSEMIDE 20 MG/1
20 TABLET ORAL DAILY
Refills: 0 | Status: DISCONTINUED | COMMUNITY
End: 2023-01-05

## 2023-01-05 RX ORDER — MULTIVITAMIN
CAPSULE ORAL
Refills: 0 | Status: DISCONTINUED | COMMUNITY
End: 2023-01-05

## 2023-01-05 NOTE — HISTORY OF PRESENT ILLNESS
[FreeTextEntry1] : 82-year-old female with obesity, wheelchair-bound, hypertension, anxiety, PSVT, history of PAF with high ventricular rates.  She had COVID-19 infection in January 2022 with moderate pulmonary hypertension.  \par \par Has intolerance to several medications.  Particularly beta-blockers.  Also has anxiety guarding several meds which may be making her uncomfortable and she admits to this. \par \par Recently discharged from [facility].  She was diagnosed with HFpEF.  Currently takes Lasix and Coumadin because NOACs were too costly.  Feels wiped out after Lasix and wants to stop it. \par \par  Atrial fibrillation: Episodes of PAF with high ventricular rate.  He does not have symptoms of high ventricular rate.  As noted above, she stopped her beta-blockers because of intolerance.\par \par Palpitations: The patient was asymptomatic during her tachycardia as determined by pulse oximetry.\par \par Hypertension: Uncontrolled.\par \par Small pericardial effusion on echocardiogram at [facility].  Patient asymptomatic for this.

## 2023-01-05 NOTE — PHYSICAL EXAM
[Normal] : clear lung fields, good air entry, no respiratory distress [de-identified] : S1-S2 is tachycardic and irregular.  No rub. [de-identified] : Obese [de-identified] : Chronic and hyperpigmented lower extremity edema bilaterally.  Firm overlying skin

## 2023-01-05 NOTE — DISCUSSION/SUMMARY
[FreeTextEntry1] : PAF: Start Cardizem for rate control.  We will also have blood pressure\par Pretension: Resume lisinopril which he used to take before.\par Pericardial effusion: It was small and asymptomatic.  Check limited echocardiogram.\par Anticoagulation with Coumadin.  Target INR between 2 and 3.\par \par Discussed treatment of anxiety with the patient and daughter.  They will discuss this with her primary care.\par \par She is intolerant of Lasix.  It" wipes her out" with fatigue and exhaustion.  She does not diurese too much.  If diuretic is needed, we will try and use torsemide next time\par \par Due to multiple side effects, treatment of her edition remains complex\par \par Thank you for this referral and allowing me to participate in the care of this patient.  If I can be of any further help or  if you have any questions, please do not hesitate to contact me\par \par \par Sincerely,\par \par Rome Edwards MD, FACC, [place]

## 2023-01-06 ENCOUNTER — NON-APPOINTMENT (OUTPATIENT)
Age: 83
End: 2023-01-06

## 2023-01-06 LAB — INR PPP: 2.61

## 2023-01-06 RX ORDER — DILTIAZEM HYDROCHLORIDE 120 MG/1
120 TABLET, EXTENDED RELEASE ORAL DAILY
Qty: 90 | Refills: 1 | Status: DISCONTINUED | COMMUNITY
Start: 2023-01-05 | End: 2023-01-06

## 2023-01-10 ENCOUNTER — TRANSCRIPTION ENCOUNTER (OUTPATIENT)
Age: 83
End: 2023-01-10

## 2023-01-11 ENCOUNTER — TRANSCRIPTION ENCOUNTER (OUTPATIENT)
Age: 83
End: 2023-01-11

## 2023-01-13 ENCOUNTER — APPOINTMENT (OUTPATIENT)
Dept: CARDIOLOGY | Facility: CLINIC | Age: 83
End: 2023-01-13

## 2023-01-17 ENCOUNTER — TRANSCRIPTION ENCOUNTER (OUTPATIENT)
Age: 83
End: 2023-01-17

## 2023-01-19 ENCOUNTER — NON-APPOINTMENT (OUTPATIENT)
Age: 83
End: 2023-01-19

## 2023-01-19 ENCOUNTER — TRANSCRIPTION ENCOUNTER (OUTPATIENT)
Age: 83
End: 2023-01-19

## 2023-01-24 LAB — INR PPP: 1.64

## 2023-01-27 LAB — INR PPP: 2.65

## 2023-01-30 ENCOUNTER — APPOINTMENT (OUTPATIENT)
Dept: CARDIOLOGY | Facility: CLINIC | Age: 83
End: 2023-01-30
Payer: MEDICARE

## 2023-01-30 VITALS
BODY MASS INDEX: 47.01 KG/M2 | OXYGEN SATURATION: 97 % | WEIGHT: 249 LBS | SYSTOLIC BLOOD PRESSURE: 168 MMHG | DIASTOLIC BLOOD PRESSURE: 78 MMHG | HEART RATE: 68 BPM | RESPIRATION RATE: 17 BRPM | TEMPERATURE: 98.1 F | HEIGHT: 61 IN

## 2023-01-30 DIAGNOSIS — R53.1 WEAKNESS: ICD-10-CM

## 2023-01-30 DIAGNOSIS — Z87.891 PERSONAL HISTORY OF NICOTINE DEPENDENCE: ICD-10-CM

## 2023-01-30 PROCEDURE — 99215 OFFICE O/P EST HI 40 MIN: CPT

## 2023-01-30 RX ORDER — LISINOPRIL 10 MG/1
10 TABLET ORAL DAILY
Qty: 90 | Refills: 3 | Status: DISCONTINUED | COMMUNITY
End: 2023-01-30

## 2023-01-30 NOTE — HISTORY OF PRESENT ILLNESS
[FreeTextEntry1] : 82-year-old female with obesity, wheelchair-bound, hypertension, anxiety, PSVT, history of PAF with high ventricular rates.  She had COVID-19 infection in January 2022 with moderate pulmonary hypertension.  \par \stephie Has intolerance to several medications.  Particularly beta-blockers.  Also has anxiety guarding several meds which may be making her uncomfortable and she admits to this. \par \stephie Has had multiple admissions to the hospital.  She was  discharged recently from [facility] on 1/16/2023 after treatment of diastolic CHF, left pleural tap of large effusion and GERHARD guided DCCV. \par \stephie Currently takes Coumadin because NOACs were too costly.  Feels wiped out after Lasix ; on torsemide.  On lisinopril 10 mg daily\par \par  Atrial fibrillation: Episodes of PAF with high ventricular rate.  As noted above, she stopped her beta-blockers because of intolerance.  During recent hospitalization, she had to undergo DCCV for RVR\par \par Hypertension: Uncontrolled.\par \par Small pericardial effusion on echocardiogram at [facility].  Patient asymptomatic for this.  Status post left pleural tap at [facility] this month

## 2023-01-30 NOTE — DISCUSSION/SUMMARY
[FreeTextEntry1] : 82-year-old female, hypertension  (but low blood pressures at home?  ) ,  PAF with high ventricular rates, few HFpEF admission to [facility], recently,  left pleural effusion was tapped.\par \par PAF: on Cardizem for rate control.  We will also help  blood pressure\par \par Hypertension: She does not want to resume lisinopril -her blood pressures at home are quite low.  This was confirmed by daughter.\par \par Pericardial effusion: It was small and asymptomatic.  He had significant left pleural effusion.  Status post pleural tap at [facility].\par \par Anticoagulation with Coumadin.  Target INR between 2 and 3.\par \par Discussed treatment of anxiety with the patient and daughter.  They will discuss this with her primary care.\par \par She is intolerant of Lasix.  It" wipes her out" with fatigue and exhaustion.  She does not diurese too much. Currently she is taking torsemide.\par \par Frequent HFpEF hospital admissions.  Refuses cardio mems.\par \par She may need ablation of PAF if it is frequent and symptomatic\par \par Due to multiple side effects, treatment of her condition remains complex\par \par Thank you for this referral and allowing me to participate in the care of this patient.  If I can be of any further help or  if you have any questions, please do not hesitate to contact me\par \par \par Sincerely,\par \par Rome Edwards MD, [facility], [place]

## 2023-01-30 NOTE — PHYSICAL EXAM
[Clear Lung Fields] : clear lung fields [de-identified] : S1-S2 has normal rate. [de-identified] : Diminished air entry left base [de-identified] : Obese [de-identified] : In wheelchair [de-identified] : Chronic and hyperpigmented lower extremity edema bilaterally.  Firm overlying skin

## 2023-02-03 ENCOUNTER — APPOINTMENT (OUTPATIENT)
Dept: THORACIC SURGERY | Facility: CLINIC | Age: 83
End: 2023-02-03
Payer: MEDICARE

## 2023-02-03 VITALS
BODY MASS INDEX: 47.24 KG/M2 | WEIGHT: 250 LBS | SYSTOLIC BLOOD PRESSURE: 154 MMHG | DIASTOLIC BLOOD PRESSURE: 72 MMHG | HEART RATE: 80 BPM | TEMPERATURE: 98 F | OXYGEN SATURATION: 96 %

## 2023-02-03 LAB — INR PPP: 3.75

## 2023-02-03 PROCEDURE — 99212 OFFICE O/P EST SF 10 MIN: CPT

## 2023-02-03 NOTE — PHYSICAL EXAM
[General Appearance - Alert] : alert [General Appearance - In No Acute Distress] : in no acute distress [General Appearance - Well Nourished] : well nourished [Neck Appearance] : the appearance of the neck was normal [] : the neck was supple [Neck Cervical Mass (___cm)] : no neck mass was observed [Examination Of The Chest] : the chest was normal in appearance [Chest Visual Inspection Thoracic Asymmetry] : no chest asymmetry [FreeTextEntry1] : clean, dry [Bowel Sounds] : normal bowel sounds [Abdomen Soft] : soft [Abdomen Tenderness] : non-tender [Skin Color & Pigmentation] : normal skin color and pigmentation [Skin Turgor] : normal skin turgor [Oriented To Time, Place, And Person] : oriented to person, place, and time

## 2023-02-03 NOTE — HISTORY OF PRESENT ILLNESS
[FreeTextEntry1] : Ms. LISANDRO BRAXTON is a 82 year old female referred by   who presents for consultation regarding pleural effusion status post thoracentesis at [facility] on 1/13/2023. \par \par Her pertinent past medical history includes obesity, wheelchair-bound, hypertension, anxiety, PSVT, history of PAF with high ventricular rates. She had COVID-19 infection in January 2022 with moderate pulmonary hypertension. \par \par Today, the patient reports no complaints of shortness of breath.

## 2023-02-03 NOTE — ASSESSMENT
[FreeTextEntry1] : Ms. Pearson was drained with a pigtail catheter at [facility] last month.  She denies any symptoms at today's visit.  She will return in one month with a chest xray to ensure there is no evidence of recurrence.

## 2023-02-06 ENCOUNTER — APPOINTMENT (OUTPATIENT)
Dept: CARDIOLOGY | Facility: CLINIC | Age: 83
End: 2023-02-06

## 2023-02-07 ENCOUNTER — NON-APPOINTMENT (OUTPATIENT)
Age: 83
End: 2023-02-07

## 2023-02-17 LAB — INR PPP: 2.78

## 2023-02-24 LAB — INR PPP: 3.68

## 2023-03-03 LAB — INR PPP: 3.5

## 2023-03-07 ENCOUNTER — NON-APPOINTMENT (OUTPATIENT)
Age: 83
End: 2023-03-07

## 2023-03-07 RX ORDER — DILTIAZEM HYDROCHLORIDE 180 MG/1
180 CAPSULE, EXTENDED RELEASE ORAL
Qty: 90 | Refills: 1 | Status: DISCONTINUED | COMMUNITY
Start: 2023-01-06 | End: 2023-03-07

## 2023-03-10 LAB — INR PPP: 2.38

## 2023-03-13 ENCOUNTER — APPOINTMENT (OUTPATIENT)
Dept: RADIOLOGY | Facility: CLINIC | Age: 83
End: 2023-03-13
Payer: MEDICARE

## 2023-03-13 ENCOUNTER — RESULT REVIEW (OUTPATIENT)
Age: 83
End: 2023-03-13

## 2023-03-13 PROCEDURE — 71046 X-RAY EXAM CHEST 2 VIEWS: CPT

## 2023-03-17 ENCOUNTER — APPOINTMENT (OUTPATIENT)
Dept: THORACIC SURGERY | Facility: CLINIC | Age: 83
End: 2023-03-17
Payer: MEDICARE

## 2023-03-17 VITALS
HEART RATE: 88 BPM | TEMPERATURE: 97.3 F | HEIGHT: 61 IN | SYSTOLIC BLOOD PRESSURE: 169 MMHG | DIASTOLIC BLOOD PRESSURE: 80 MMHG | WEIGHT: 253 LBS | OXYGEN SATURATION: 94 % | BODY MASS INDEX: 47.77 KG/M2

## 2023-03-17 PROCEDURE — 99211 OFF/OP EST MAY X REQ PHY/QHP: CPT

## 2023-03-17 NOTE — REASON FOR VISIT
[de-identified] : pleural effusion status post thoracentesis [de-identified] : 1/13/23 [de-identified] : No complaints of shortness of breath.

## 2023-03-17 NOTE — DISCUSSION/SUMMARY
[Doing Well] : is doing well [Excellent Pain Control] : has excellent pain control [No Sign of Infection] : is showing no signs of infection [Coumadin] : the patient is not on Coumadin [Clinical Recheck] : clinical recheck [FreeTextEntry1] : Ms. Rojas's most recent chest x ray shows no signs of recurrent pleural effusion. She will return as needed.

## 2023-03-17 NOTE — ASSESSMENT
[FreeTextEntry1] : Ms. LISANDRO BRAXTON is a 82 year old female referred by  who presents for consultation regarding pleural effusion status post thoracentesis at [facility] on 1/13/2023. \par \par Her pertinent past medical history includes obesity, wheelchair-bound, hypertension, anxiety, PSVT, history of PAF with high ventricular rates. She had COVID-19 infection in January 2022 with moderate pulmonary hypertension.  He is here for follow up visit.   \par \par

## 2023-03-17 NOTE — PROCEDURE
[FreeTextEntry1] : 12/8/22 Non contrast CT Chest revealed small bilateral pleural effusion (LT > RT ) with bibasilar compressive atelectasis. Small to moderate pericardial effusion. Cardiomegaly. \par \par 1/12/23 GERHARD revealed LVEF 60%, Mild MR/AR. Grade I to II atherosclerotic plaque seen in descending aorta. \par \par 3/13/23 Chest X-Ray revealed The airway is midline.There are no airspace consolidations. No pleural effusion or pneumothorax.

## 2023-03-17 NOTE — CONSULT LETTER
[Dear  ___] : Dear  [unfilled], [Courtesy Letter:] : I had the pleasure of seeing your patient, [unfilled], in my office today. [Please see my note below.] : Please see my note below. [Consult Closing:] : Thank you very much for allowing me to participate in the care of this patient.  If you have any questions, please do not hesitate to contact me. [Sincerely,] : Sincerely, [FreeTextEntry3] : Bryce Pereyra MD\par Director of Thoracic, [facility]\par Cardiovascular & Thoracic Surgery\par [address] [place] [address] \[place] [address]\par Tel: [phone]\par Fax: [phone]\par \par

## 2023-03-20 LAB — INR PPP: 2.13

## 2023-03-24 LAB — INR PPP: 3.13

## 2023-03-31 LAB — INR PPP: 2.62

## 2023-04-10 ENCOUNTER — APPOINTMENT (OUTPATIENT)
Dept: CARDIOLOGY | Facility: CLINIC | Age: 83
End: 2023-04-10
Payer: MEDICARE

## 2023-04-10 VITALS
HEIGHT: 61 IN | WEIGHT: 258 LBS | BODY MASS INDEX: 48.71 KG/M2 | OXYGEN SATURATION: 96 % | HEART RATE: 91 BPM | SYSTOLIC BLOOD PRESSURE: 168 MMHG | DIASTOLIC BLOOD PRESSURE: 68 MMHG

## 2023-04-10 DIAGNOSIS — J90 PLEURAL EFFUSION, NOT ELSEWHERE CLASSIFIED: ICD-10-CM

## 2023-04-10 PROCEDURE — 99214 OFFICE O/P EST MOD 30 MIN: CPT

## 2023-04-10 RX ORDER — LISINOPRIL 10 MG/1
10 TABLET ORAL
Qty: 90 | Refills: 0 | Status: DISCONTINUED | COMMUNITY
Start: 2023-02-07 | End: 2023-04-10

## 2023-04-10 RX ORDER — TORSEMIDE 20 MG/1
20 TABLET ORAL
Qty: 90 | Refills: 1 | Status: DISCONTINUED | COMMUNITY
End: 2023-04-10

## 2023-04-10 NOTE — HISTORY OF PRESENT ILLNESS
[FreeTextEntry1] : 83-year-old female with obesity, wheelchair-bound, hypertension, anxiety, PSVT, history of PAF with high ventricular rates.  She had COVID-19 infection in January 2022 with moderate pulmonary hypertension.  \par \stpehie Has intolerance to several medications.  Particularly beta-blockers.  Also has anxiety guarding several meds which may be making her uncomfortable and she admits to this. \par \stephie Has had multiple admissions to the hospital.  She was  [place] [facility] [place] after treatment of diastolic CHF, left pleural tap of large effusion and GERHARD guided DCCV. \par \stephie Currently takes Coumadin because NOACs were too costly.  Feels wiped out after Lasix ; tried torsemide and went back to Lasix.  Stopped lisinopril.\par \par  Atrial fibrillation: Episodes of PAF with high ventricular rate.  As noted above, she stopped her beta-blockers because of intolerance.  During recent hospitalization, she had to undergo DCCV for RVR\par \par Hypertension: Uncontrolled.\par \par Small pericardial effusion on echocardi[place] [facility].  Patient asymptomatic for this.  Status post left pleural tap at [facility] this month

## 2023-04-10 NOTE — PHYSICAL EXAM
[Clear Lung Fields] : clear lung fields [de-identified] : S1-S2 has normal rate. [de-identified] : Diminished air entry left base [de-identified] : Obese [de-identified] : In wheelchair [de-identified] : Chronic and hyperpigmented lower extremity edema bilaterally.  Firm overlying skin

## 2023-04-10 NOTE — DISCUSSION/SUMMARY
[FreeTextEntry1] : 83-year-old female, labile hypertension  ( low blood pressures at home?  ) ,  PAF with high ventricular rates, few HFpEF admissions to [facility], recently,  left pleural effusion was tapped.\par \par PAF: He has stopped Cardizem.  On warfarin.\par \par Hypertension: She does not want to resume lisinopril -her blood pressures at home are quite low.  This was confirmed by daughter.  She takes Lasix which also helps her pedal edema.  When her blood pressures are high, she takes torsemide which lowers them effectively.\par \par Pericardial effusion: It was small and asymptomatic.  He had significant left pleural effusion.  Status post pleural tap at [facility].  Follow-up pericardial effusion in future with echocardiogram.\par \par Anticoagulation with Coumadin.  Target INR between 2 and 3.\par \par Discussed treatment of anxiety with the patient and daughter.  They will discuss this with her primary care.\par \par Frequent HFpEF hospital admissions.  Refuses cardio mems.\par \par She may need ablation of PAF if it is frequent and symptomatic -declines\par \par Due to multiple drug side effects, treatment of her condition remains complex\par \par Thank you for this referral and allowing me to participate in the care of this patient.  If I can be of any further help or  if you have any questions, please do not hesitate to contact me\par \par \par Sincerely,\par \par Rome Edwards MD, [facility], [place]

## 2023-04-14 LAB — INR PPP: 2.35

## 2023-04-21 LAB — INR PPP: 2.44

## 2023-05-01 LAB — INR PPP: 2.49

## 2023-05-05 LAB — INR PPP: 3.24

## 2023-05-12 LAB — INR PPP: 2.15

## 2023-05-19 LAB — INR PPP: 2.33

## 2023-05-26 LAB
INR PPP: 2.41
INR PPP: 2.94

## 2023-06-02 LAB — INR PPP: 2.54

## 2023-06-09 LAB — INR PPP: 2.49

## 2023-06-16 LAB — INR PPP: 2.31

## 2023-06-23 LAB — INR PPP: 2.53

## 2023-06-30 LAB — INR PPP: 2.32

## 2023-07-07 LAB — INR PPP: 2.27

## 2023-07-19 LAB — INR PPP: 2

## 2023-08-04 LAB
INR PPP: 1.95
INR PPP: 2.25

## 2023-08-11 LAB — INR PPP: 2.28

## 2023-08-14 ENCOUNTER — APPOINTMENT (OUTPATIENT)
Dept: CARDIOLOGY | Facility: CLINIC | Age: 83
End: 2023-08-14
Payer: MEDICARE

## 2023-08-14 PROCEDURE — 93308 TTE F-UP OR LMTD: CPT

## 2023-08-18 LAB — INR PPP: 1.99

## 2023-08-22 ENCOUNTER — APPOINTMENT (OUTPATIENT)
Dept: CARDIOLOGY | Facility: CLINIC | Age: 83
End: 2023-08-22
Payer: MEDICARE

## 2023-08-22 VITALS
HEIGHT: 61 IN | DIASTOLIC BLOOD PRESSURE: 84 MMHG | BODY MASS INDEX: 47.58 KG/M2 | WEIGHT: 252 LBS | OXYGEN SATURATION: 98 % | RESPIRATION RATE: 16 BRPM | SYSTOLIC BLOOD PRESSURE: 160 MMHG | HEART RATE: 100 BPM

## 2023-08-22 VITALS — DIASTOLIC BLOOD PRESSURE: 74 MMHG | WEIGHT: 252 LBS | BODY MASS INDEX: 47.62 KG/M2 | SYSTOLIC BLOOD PRESSURE: 142 MMHG

## 2023-08-22 PROCEDURE — 99214 OFFICE O/P EST MOD 30 MIN: CPT

## 2023-08-22 RX ORDER — FUROSEMIDE 20 MG/1
20 TABLET ORAL DAILY
Refills: 0 | Status: DISCONTINUED | COMMUNITY
End: 2023-08-22

## 2023-08-22 NOTE — DISCUSSION/SUMMARY
[FreeTextEntry1] : 83-year-old female, labile hypertension  ( low blood pressures at home?  ) ,  PAF with high ventricular rates, few HFpEF admissions to [facility], recently,  left pleural effusion was tapped.  PAF: She has stopped Cardizem.  She adjusts and change her own medications;  on warfarin.   Hypertension: She now takes lisinopril on a as needed basis.  Home blood pressure log shows very good control.  This was confirmed by daughter.  She takes Lasix which also helps her pedal edema.  When her blood pressures are high, she takes torsemide which lowers them effectively.  Pericardial effusion: It was trivial and asymptomatic and stable -last echo in August 2023.  She also had significant left pleural effusion.  Status post pleural tap at [facility].    Anticoagulation with Coumadin.  Target INR between 2 and 3.  Discussed treatment of anxiety with the patient and daughter.  They will discuss this with her primary care.  Use of long-term anxiolytic such as Klonopin may be valuable.  Frequent HFpEF hospital admissions.  Refuses cardio mems.  She may benefit from ablation of PAF if it is frequent and symptomatic -declines  Due to multiple drug side effects, treatment of her condition remains complex  Thank you for this referral and allowing me to participate in the care of this patient.  If I can be of any further help or  if you have any questions, please do not hesitate to contact me   Sincerely,  Rome Edwards MD, [facility], [place]

## 2023-08-22 NOTE — PHYSICAL EXAM
[Clear Lung Fields] : clear lung fields [de-identified] : S1-S2 has normal rate. [de-identified] : Diminished air entry left base [de-identified] : Obese [de-identified] : In wheelchair [de-identified] : Chronic and hyperpigmented lower extremity edema bilaterally.  Firm overlying skin

## 2023-08-22 NOTE — HISTORY OF PRESENT ILLNESS
[FreeTextEntry1] : 83-year-old female with obesity, wheelchair-bound, hypertension, anxiety, PSVT, history of PAF with high ventricular rates.  She had COVID-19 infection in January 2022 with moderate pulmonary hypertension.    Has intolerance to several medications.  Particularly beta-blockers.  Also has anxiety guarding several meds which may be making her uncomfortable and she admits to this.   Has had multiple admissions to the hospital.  Discharged from [facility] on 1/16/2023 after treatment of diastolic CHF, left pleural tap of large effusion and GERHARD guided DCCV.   Currently takes Coumadin because NOACs were too costly.  Feels wiped out after Lasix ; tried torsemide and went back to Lasix.  Stopped lisinopril.  She makes adjustments on medications on her own.   Atrial fibrillation: Episodes of PAF with high ventricular rate.  As noted above, she stopped her beta-blockers because of intolerance.  During recent hospitalization, she had to undergo DCCV for RVR  Hypertension: Often uncontrolled.  Today, blood pressure is: 142/74 after 10 minutes of resting.  The patient has anxiety in doctors offices.  Small pericardial effusion on echocardiogram at [facility].  Patient asymptomatic for this.  Status post left pleural tap at [facility].  Follow-up limited echo 8/14/2023: Perhaps trivial pericardial effusion is noted.  Normal EF.  Labs from August 2023: Normal LFT.  Normal creatinine.  Normal TSH.  A1c 5.6.  LDL 90.  Hemoglobin 13.9

## 2023-09-20 LAB — INR PPP: 2.8

## 2023-10-19 LAB — INR PPP: 3.24

## 2023-11-02 LAB — INR PPP: 2.28

## 2023-11-02 RX ORDER — WARFARIN 2.5 MG/1
2.5 TABLET ORAL DAILY
Qty: 90 | Refills: 1 | Status: ACTIVE | COMMUNITY
Start: 2023-02-24 | End: 1900-01-01

## 2023-11-02 RX ORDER — LISINOPRIL 20 MG/1
20 TABLET ORAL DAILY
Refills: 0 | Status: ACTIVE | COMMUNITY

## 2023-11-22 LAB — INR PPP: 2.17

## 2023-12-20 LAB — INR PPP: 2.56

## 2024-01-18 LAB — INR PPP: 1.74

## 2024-03-01 ENCOUNTER — NON-APPOINTMENT (OUTPATIENT)
Age: 84
End: 2024-03-01

## 2024-03-05 DIAGNOSIS — K62.5 HEMORRHAGE OF ANUS AND RECTUM: ICD-10-CM

## 2024-03-06 LAB
INR PPP: 1.89
INR PPP: 2.03

## 2024-03-07 ENCOUNTER — APPOINTMENT (OUTPATIENT)
Dept: CARDIOLOGY | Facility: CLINIC | Age: 84
End: 2024-03-07
Payer: MEDICARE

## 2024-03-07 VITALS
BODY MASS INDEX: 47.95 KG/M2 | WEIGHT: 254 LBS | RESPIRATION RATE: 14 BRPM | OXYGEN SATURATION: 97 % | SYSTOLIC BLOOD PRESSURE: 168 MMHG | DIASTOLIC BLOOD PRESSURE: 90 MMHG | HEIGHT: 61 IN | HEART RATE: 103 BPM

## 2024-03-07 DIAGNOSIS — I50.30 UNSPECIFIED DIASTOLIC (CONGESTIVE) HEART FAILURE: ICD-10-CM

## 2024-03-07 DIAGNOSIS — I31.39 OTHER PERICARDIAL EFFUSION (NONINFLAMMATORY): ICD-10-CM

## 2024-03-07 DIAGNOSIS — F41.9 ANXIETY DISORDER, UNSPECIFIED: ICD-10-CM

## 2024-03-07 DIAGNOSIS — R60.0 LOCALIZED EDEMA: ICD-10-CM

## 2024-03-07 DIAGNOSIS — R53.83 OTHER FATIGUE: ICD-10-CM

## 2024-03-07 DIAGNOSIS — I48.91 UNSPECIFIED ATRIAL FIBRILLATION: ICD-10-CM

## 2024-03-07 DIAGNOSIS — I10 ESSENTIAL (PRIMARY) HYPERTENSION: ICD-10-CM

## 2024-03-07 DIAGNOSIS — E66.01 MORBID (SEVERE) OBESITY DUE TO EXCESS CALORIES: ICD-10-CM

## 2024-03-07 DIAGNOSIS — I47.19 OTHER SUPRAVENTRICULAR TACHYCARDIA: ICD-10-CM

## 2024-03-07 DIAGNOSIS — R00.2 PALPITATIONS: ICD-10-CM

## 2024-03-07 PROCEDURE — G2211 COMPLEX E/M VISIT ADD ON: CPT

## 2024-03-07 PROCEDURE — 99214 OFFICE O/P EST MOD 30 MIN: CPT

## 2024-03-07 RX ORDER — TORSEMIDE 20 MG/1
20 TABLET ORAL DAILY
Qty: 90 | Refills: 0 | Status: DISCONTINUED | COMMUNITY
Start: 1900-01-01 | End: 2024-03-07

## 2024-03-07 NOTE — PHYSICAL EXAM
[Clear Lung Fields] : clear lung fields [de-identified] : Diminished air entry left base [de-identified] : S1-S2 has normal rate. [de-identified] : In wheelchair [de-identified] : Chronic and hyperpigmented lower extremity edema bilaterally.  Firm overlying skin [de-identified] : Obese

## 2024-03-07 NOTE — HISTORY OF PRESENT ILLNESS
[FreeTextEntry1] : 83-year-old female with obesity, wheelchair-bound, hypertension, anxiety, PSVT, history of PAF with high ventricular rates.  She had COVID-19 infection in January 2022 with moderate pulmonary hypertension.    Has intolerance to several medications.  Particularly beta-blockers.  Also has anxiety regarding several meds which may be making her uncomfortable and she admits to this.   Has had multiple admissions to the hospital.  [place] [facility] [place] after treatment of diastolic CHF, left pleural tap of large effusion and GERHARD guided DCCV.  Also recently, cellulitis in the leg.  Currently takes Coumadin because NOACs were too costly.  Feels wiped out after Lasix ; tried torsemide but she has several side effects to torsemide including flushing.  Stopped lisinopril.  Last time she took it was more than 2 weeks ago.  She makes adjustments on medications on her own.   Atrial fibrillation: Episodes of PAF with high ventricular rate.  As noted above, she stopped her beta-blockers because of intolerance.  During recent hospitalization, she had to undergo DCCV for RVR  Hypertension: Often uncontrolled.  Today, blood pressure is: 152/74 after 10 minutes of resting.  The patient has anxiety in doctors offices.  Small pericardial effusion on echocardiogram at [facility].  Patient asymptomatic for this.  Status post left pleural tap at [facility].  Follow-up limited echo 8/14/2023: Perhaps trivial pericardial effusion is noted.  Normal EF.  Labs from August 2023: Normal LFT.  Normal creatinine.  Normal TSH.  A1c 5.6.  LDL 90.  Hemoglobin 13.9

## 2024-03-07 NOTE — DISCUSSION/SUMMARY
[FreeTextEntry1] : 84-year-old female, labile hypertension  ( low blood pressures at home?  ) ,  PAF with high ventricular rates, few HFpEF admissions to [facility], recently,  left pleural effusion was tapped.  PAF: She has stopped Cardizem.  She adjusts and change her own medications;  on warfarin.  She has complained of rather strange symptoms with warfarin and wants to stop it.  She understands the risk of CVA or acute cardiac embolic events and is reconsidering.   Hypertension: She now takes lisinopril on a as needed basis.  Home blood pressure log shows very good control.  This was confirmed by daughter.  She takes Lasix which also helps her pedal edema.  When her blood pressures are high, she takes torsemide which lowers them effectively.  Pericardial effusion: It was trivial and asymptomatic and stable -last echo in August 2023.  She also had significant left pleural effusion.  Status post pleural tap at [facility].    Use of long-term anxiolytic such as Klonopin may be valuable.  Frequent HFpEF hospital admissions.  Refuses cardio mems.  She may benefit from ablation of PAF if it is frequent and symptomatic -declines  Due to multiple drug side effects, treatment of her condition remains complex  Thank you for this referral and allowing me to participate in the care of this patient.  If I can be of any further help or  if you have any questions, please do not hesitate to contact me   Sincerely,  Rome Edwards MD, FACC, [place]

## 2024-03-20 LAB — INR PPP: 2.01

## 2024-04-16 RX ORDER — FUROSEMIDE 40 MG/1
40 TABLET ORAL DAILY
Qty: 30 | Refills: 1 | Status: DISCONTINUED | COMMUNITY
Start: 2024-03-07 | End: 2024-04-16

## 2024-04-16 RX ORDER — TORSEMIDE 20 MG/1
20 TABLET ORAL DAILY
Qty: 90 | Refills: 1 | Status: ACTIVE | COMMUNITY
Start: 2024-04-16 | End: 1900-01-01

## 2024-04-18 LAB — INR PPP: 2.29

## 2024-04-21 ENCOUNTER — TRANSCRIPTION ENCOUNTER (OUTPATIENT)
Age: 84
End: 2024-04-21

## 2024-05-22 LAB — INR PPP: 2.01

## 2024-06-20 LAB — INR PPP: 2.22

## 2024-06-27 ENCOUNTER — APPOINTMENT (OUTPATIENT)
Dept: ULTRASOUND IMAGING | Facility: CLINIC | Age: 84
End: 2024-06-27

## 2024-06-27 PROCEDURE — 76536 US EXAM OF HEAD AND NECK: CPT

## 2024-07-18 LAB — INR PPP: 1.96

## 2024-08-21 ENCOUNTER — NON-APPOINTMENT (OUTPATIENT)
Age: 84
End: 2024-08-21

## 2024-08-21 LAB — INR PPP: 1.93

## 2024-08-29 LAB — INR PPP: 2.01

## 2024-09-12 ENCOUNTER — APPOINTMENT (OUTPATIENT)
Dept: CARDIOLOGY | Facility: CLINIC | Age: 84
End: 2024-09-12
Payer: MEDICARE

## 2024-09-12 VITALS
OXYGEN SATURATION: 98 % | SYSTOLIC BLOOD PRESSURE: 152 MMHG | RESPIRATION RATE: 14 BRPM | HEART RATE: 100 BPM | HEIGHT: 61 IN | BODY MASS INDEX: 48.71 KG/M2 | WEIGHT: 258 LBS | DIASTOLIC BLOOD PRESSURE: 80 MMHG

## 2024-09-12 DIAGNOSIS — R60.0 LOCALIZED EDEMA: ICD-10-CM

## 2024-09-12 DIAGNOSIS — I10 ESSENTIAL (PRIMARY) HYPERTENSION: ICD-10-CM

## 2024-09-12 DIAGNOSIS — R00.2 PALPITATIONS: ICD-10-CM

## 2024-09-12 DIAGNOSIS — I50.30 UNSPECIFIED DIASTOLIC (CONGESTIVE) HEART FAILURE: ICD-10-CM

## 2024-09-12 DIAGNOSIS — E66.01 MORBID (SEVERE) OBESITY DUE TO EXCESS CALORIES: ICD-10-CM

## 2024-09-12 DIAGNOSIS — F41.9 ANXIETY DISORDER, UNSPECIFIED: ICD-10-CM

## 2024-09-12 DIAGNOSIS — I31.39 OTHER PERICARDIAL EFFUSION (NONINFLAMMATORY): ICD-10-CM

## 2024-09-12 DIAGNOSIS — I47.19 OTHER SUPRAVENTRICULAR TACHYCARDIA: ICD-10-CM

## 2024-09-12 DIAGNOSIS — I48.91 UNSPECIFIED ATRIAL FIBRILLATION: ICD-10-CM

## 2024-09-12 DIAGNOSIS — R53.83 OTHER FATIGUE: ICD-10-CM

## 2024-09-12 PROCEDURE — G2211 COMPLEX E/M VISIT ADD ON: CPT

## 2024-09-12 PROCEDURE — 99214 OFFICE O/P EST MOD 30 MIN: CPT

## 2024-09-12 NOTE — PHYSICAL EXAM
[Clear Lung Fields] : clear lung fields [de-identified] : S1-S2 has normal rate. [de-identified] : Diminished air entry left base [de-identified] : Obese [de-identified] : In wheelchair [de-identified] : Chronic and hyperpigmented lower extremity edema bilaterally.  Firm overlying skin

## 2024-09-12 NOTE — DISCUSSION/SUMMARY
[FreeTextEntry1] : 84-year-old female, labile hypertension  ( low blood pressures at home?  ) ,  PAF with high ventricular rates, few HFpEF admissions to [facility], recently,  left pleural effusion was tapped.  PAF: She has stopped Cardizem.  She adjusts and change her own medications;  on warfarin.  She has complained of rather strange symptoms with warfarin and wants to stop it.  She understands the risk of CVA or acute cardiac embolic events and is reconsidering.   Hypertension: She now takes lisinopril on a as needed basis.  Home blood pressure log shows very good control.  This was confirmed by daughter.  She takes torsemide which also helps her pedal edema.    Pericardial effusion: It was trivial and asymptomatic and stable -last echo in August 2023.  She also had significant left pleural effusion.  Status post pleural tap at [facility].    Use of long-term anxiolytic such as Klonopin may be valuable.  History of frequent HFpEF hospital admissions.  Refuses cardio mems.  Relatively stable in the last 6 months.  She may benefit from ablation of PAF if it is frequent and symptomatic -declines  Due to multiple drug side effects, treatment of her condition remains complex  Thank you for this referral and allowing me to participate in the care of this patient.  If I can be of any further help or  if you have any questions, please do not hesitate to contact me   Sincerely,  Rome Edwards MD, [facility], [place]

## 2024-09-23 LAB — INR PPP: 2.26

## 2024-10-17 LAB — INR PPP: 3.15

## 2024-11-20 LAB
INR PPP: 1.97
PROTHROMBIN TIME COMMENT: NORMAL

## 2024-11-27 LAB — INR PPP: 2.15

## 2024-12-05 LAB — INR PPP: 2.18

## 2025-01-10 ENCOUNTER — LABORATORY RESULT (OUTPATIENT)
Age: 85
End: 2025-01-10

## 2025-01-23 LAB — INR PPP: 2.33

## 2025-02-04 ENCOUNTER — LABORATORY RESULT (OUTPATIENT)
Age: 85
End: 2025-02-04

## 2025-03-04 ENCOUNTER — LABORATORY RESULT (OUTPATIENT)
Age: 85
End: 2025-03-04

## 2025-03-13 ENCOUNTER — APPOINTMENT (OUTPATIENT)
Dept: CARDIOLOGY | Facility: CLINIC | Age: 85
End: 2025-03-13
Payer: MEDICARE

## 2025-03-13 ENCOUNTER — NON-APPOINTMENT (OUTPATIENT)
Age: 85
End: 2025-03-13

## 2025-03-13 VITALS
BODY MASS INDEX: 48.52 KG/M2 | DIASTOLIC BLOOD PRESSURE: 68 MMHG | HEIGHT: 61 IN | SYSTOLIC BLOOD PRESSURE: 144 MMHG | OXYGEN SATURATION: 98 % | WEIGHT: 257 LBS | HEART RATE: 97 BPM

## 2025-03-13 DIAGNOSIS — I10 ESSENTIAL (PRIMARY) HYPERTENSION: ICD-10-CM

## 2025-03-13 DIAGNOSIS — F41.9 ANXIETY DISORDER, UNSPECIFIED: ICD-10-CM

## 2025-03-13 DIAGNOSIS — R00.2 PALPITATIONS: ICD-10-CM

## 2025-03-13 DIAGNOSIS — I50.30 UNSPECIFIED DIASTOLIC (CONGESTIVE) HEART FAILURE: ICD-10-CM

## 2025-03-13 DIAGNOSIS — E66.01 MORBID (SEVERE) OBESITY DUE TO EXCESS CALORIES: ICD-10-CM

## 2025-03-13 DIAGNOSIS — I47.19 OTHER SUPRAVENTRICULAR TACHYCARDIA: ICD-10-CM

## 2025-03-13 DIAGNOSIS — I31.39 OTHER PERICARDIAL EFFUSION (NONINFLAMMATORY): ICD-10-CM

## 2025-03-13 DIAGNOSIS — J90 PLEURAL EFFUSION, NOT ELSEWHERE CLASSIFIED: ICD-10-CM

## 2025-03-13 DIAGNOSIS — R53.83 OTHER FATIGUE: ICD-10-CM

## 2025-03-13 DIAGNOSIS — R60.0 LOCALIZED EDEMA: ICD-10-CM

## 2025-03-13 DIAGNOSIS — I48.91 UNSPECIFIED ATRIAL FIBRILLATION: ICD-10-CM

## 2025-03-13 PROCEDURE — G2211 COMPLEX E/M VISIT ADD ON: CPT

## 2025-03-13 PROCEDURE — 99214 OFFICE O/P EST MOD 30 MIN: CPT

## 2025-03-18 RX ORDER — FUROSEMIDE 40 MG/1
40 TABLET ORAL
Qty: 90 | Refills: 1 | Status: ACTIVE | COMMUNITY
Start: 2025-03-18 | End: 1900-01-01

## 2025-04-03 LAB — INR PPP: 3.79

## 2025-04-08 ENCOUNTER — LABORATORY RESULT (OUTPATIENT)
Age: 85
End: 2025-04-08

## 2025-05-01 ENCOUNTER — LABORATORY RESULT (OUTPATIENT)
Age: 85
End: 2025-05-01

## 2025-05-05 ENCOUNTER — LABORATORY RESULT (OUTPATIENT)
Age: 85
End: 2025-05-05

## 2025-05-15 ENCOUNTER — LABORATORY RESULT (OUTPATIENT)
Age: 85
End: 2025-05-15

## 2025-05-16 ENCOUNTER — LABORATORY RESULT (OUTPATIENT)
Age: 85
End: 2025-05-16

## 2025-06-03 ENCOUNTER — LABORATORY RESULT (OUTPATIENT)
Age: 85
End: 2025-06-03

## 2025-06-17 ENCOUNTER — LABORATORY RESULT (OUTPATIENT)
Age: 85
End: 2025-06-17

## 2025-07-01 ENCOUNTER — LABORATORY RESULT (OUTPATIENT)
Age: 85
End: 2025-07-01

## 2025-07-21 NOTE — HISTORY OF PRESENT ILLNESS
[FreeTextEntry1] : 83-year-old female with obesity, wheelchair-bound, hypertension, anxiety, PSVT, history of PAF with high ventricular rates.  She had COVID-19 infection in January 2022 with moderate pulmonary hypertension.    Has intolerance to several medications.  Particularly beta-blockers.  Also has anxiety regarding several meds which may be making her uncomfortable and she admits to this.   Has had multiple admissions to the hospital.  Discharged from [facility] on 1/16/2023 after treatment of diastolic CHF, left pleural tap of large effusion and GERHARD guided DCCV.  Also recently, cellulitis in the leg.  No hospitalizations since last OV.  Currently takes Coumadin because NOACs were too costly.  Feels wiped out after Lasix ; tried torsemide -she does have some side effects to it but are tolerable.   She takes lisinopril when she feels like it.  Less than 1-2 times per week.  She makes adjustments on medications on her own.   Atrial fibrillation: Episodes of PAF with high ventricular rate.  As noted above, she stopped her beta-blockers because of intolerance.  During recent hospitalization, she had to undergo DCCV for RVR  Hypertension: Often uncontrolled.  Today, blood pressure is: 152/780 after 10 minutes of resting.  The patient has anxiety in doctors offices.  Small pericardial effusion on echocardiogram at [facility].  Patient asymptomatic for this.  Status post left pleural tap at [facility].  Follow-up limited echo 8/14/2023: Perhaps trivial pericardial effusion is noted.  Normal EF.  Labs from August 2023: Normal LFT.  Normal creatinine.  Normal TSH.  A1c 5.6.  LDL 90.  Hemoglobin 13.9 None

## 2025-07-23 ENCOUNTER — LABORATORY RESULT (OUTPATIENT)
Age: 85
End: 2025-07-23

## 2025-08-05 ENCOUNTER — LABORATORY RESULT (OUTPATIENT)
Age: 85
End: 2025-08-05

## 2025-09-03 ENCOUNTER — LABORATORY RESULT (OUTPATIENT)
Age: 85
End: 2025-09-03

## 2025-09-04 ENCOUNTER — LABORATORY RESULT (OUTPATIENT)
Age: 85
End: 2025-09-04

## 2025-09-15 ENCOUNTER — APPOINTMENT (OUTPATIENT)
Dept: CARDIOLOGY | Facility: CLINIC | Age: 85
End: 2025-09-15
Payer: MEDICARE

## 2025-09-15 VITALS
BODY MASS INDEX: 46.82 KG/M2 | SYSTOLIC BLOOD PRESSURE: 140 MMHG | RESPIRATION RATE: 14 BRPM | OXYGEN SATURATION: 98 % | HEIGHT: 61 IN | HEART RATE: 88 BPM | DIASTOLIC BLOOD PRESSURE: 66 MMHG | WEIGHT: 248 LBS

## 2025-09-15 DIAGNOSIS — E66.01 MORBID (SEVERE) OBESITY DUE TO EXCESS CALORIES: ICD-10-CM

## 2025-09-15 DIAGNOSIS — I47.19 OTHER SUPRAVENTRICULAR TACHYCARDIA: ICD-10-CM

## 2025-09-15 DIAGNOSIS — I48.91 UNSPECIFIED ATRIAL FIBRILLATION: ICD-10-CM

## 2025-09-15 DIAGNOSIS — F41.9 ANXIETY DISORDER, UNSPECIFIED: ICD-10-CM

## 2025-09-15 DIAGNOSIS — R60.0 LOCALIZED EDEMA: ICD-10-CM

## 2025-09-15 DIAGNOSIS — I50.30 UNSPECIFIED DIASTOLIC (CONGESTIVE) HEART FAILURE: ICD-10-CM

## 2025-09-15 DIAGNOSIS — I10 ESSENTIAL (PRIMARY) HYPERTENSION: ICD-10-CM

## 2025-09-15 DIAGNOSIS — I31.39 OTHER PERICARDIAL EFFUSION (NONINFLAMMATORY): ICD-10-CM

## 2025-09-15 PROCEDURE — 99214 OFFICE O/P EST MOD 30 MIN: CPT

## 2025-09-15 RX ORDER — TORSEMIDE 20 MG/1
20 TABLET ORAL DAILY
Qty: 90 | Refills: 2 | Status: ACTIVE | COMMUNITY
Start: 1900-01-01 | End: 1900-01-01